# Patient Record
Sex: MALE | Race: BLACK OR AFRICAN AMERICAN | NOT HISPANIC OR LATINO | Employment: PART TIME | URBAN - METROPOLITAN AREA
[De-identification: names, ages, dates, MRNs, and addresses within clinical notes are randomized per-mention and may not be internally consistent; named-entity substitution may affect disease eponyms.]

---
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
## 2023-09-01 NOTE — PATIENT INSTRUCTIONS
Can return to work with glove on the hand.  Be careful to avoid injury on the R hand for the next two weeks.  Return for any changes.   Yes

## 2025-03-24 ENCOUNTER — HOSPITAL ENCOUNTER (EMERGENCY)
Facility: HOSPITAL | Age: 20
Discharge: HOME | End: 2025-03-24
Attending: EMERGENCY MEDICINE

## 2025-03-24 VITALS
TEMPERATURE: 98 F | WEIGHT: 180 LBS | DIASTOLIC BLOOD PRESSURE: 71 MMHG | HEIGHT: 70 IN | HEART RATE: 70 BPM | BODY MASS INDEX: 25.77 KG/M2 | SYSTOLIC BLOOD PRESSURE: 102 MMHG | RESPIRATION RATE: 19 BRPM | OXYGEN SATURATION: 100 %

## 2025-03-24 DIAGNOSIS — H01.001 BLEPHARITIS OF RIGHT UPPER EYELID, UNSPECIFIED TYPE: Primary | ICD-10-CM

## 2025-03-24 PROCEDURE — 99283 EMERGENCY DEPT VISIT LOW MDM: CPT

## 2025-03-24 RX ORDER — ERYTHROMYCIN 5 MG/G
OINTMENT OPHTHALMIC
Qty: 3.5 G | Refills: 0 | Status: SHIPPED | OUTPATIENT
Start: 2025-03-24

## 2025-03-24 NOTE — ED PROVIDER NOTES
Encounter Date: 3/24/2025       History     Chief Complaint   Patient presents with    Eye Problem     C/O R eye swelling since yesterday morning w/ irritation. Denies drainage/vision change     See MDM    The history is provided by the patient. No  was used.     Review of patient's allergies indicates:  No Known Allergies  Past Medical History:   Diagnosis Date    Family history of ulcerative colitis 2/6/2014    Hypertriglyceridemia 8/10/2019     No past surgical history on file.  Family History   Problem Relation Name Age of Onset    Ulcerative colitis Mother Annia      Social History[1]  Review of Systems   Eyes:  Positive for redness.   All other systems reviewed and are negative.      Physical Exam     Initial Vitals [03/24/25 1222]   BP Pulse Resp Temp SpO2   121/81 80 18 97.8 °F (36.6 °C) 99 %      MAP       --         Physical Exam    Nursing note and vitals reviewed.  Constitutional: He appears well-developed and well-nourished.   Eyes: Conjunctivae and EOM are normal. Pupils are equal, round, and reactive to light.   Swelling along lash line with some redness. No obvious stye. No drainage. Conjunctiva normal    Cardiovascular:  Normal rate.           Pulmonary/Chest: No respiratory distress.     Neurological: He is alert and oriented to person, place, and time.   Skin: Skin is warm and dry.         ED Course   Procedures  Labs Reviewed - No data to display       Imaging Results    None          Medications - No data to display  Medical Decision Making  19 y/o male presents with 1 week of right upper eyelid irritation/redness. No visual changes. No eye drainage.     Will treat for blepharitis with warm compresses and eye ointment       Additional MDM:   Differential Diagnosis:   Other: The following diagnoses were also considered and will be evaluated: stye, blepharitis and conjunctivitis.                                   Clinical Impression:  Final diagnoses:  [H01.001] Blepharitis  PT DAILY TREATMENT NOTE     Patient Name: Karine Azul. Date:2021  : 1946  [x]  Patient  Verified  Payor: William Moscoso / Plan: [employer] / Product Type: Managed Care Medicare /    In time: 10:16 AM  Out time: 11:10 AM  Total Treatment Time (min): 54  Visit #: 2 of 12    Medicare/BCBS Only   Total Timed Codes (min):  44 1:1 Treatment Time:  44       Treatment Area: Pain in right shoulder [M25.511]    SUBJECTIVE  Pain Level (0-10 scale): 6  Any medication changes, allergies to medications, adverse drug reactions, diagnosis change, or new procedure performed?: [x] No    [] Yes (see summary sheet for update)  Subjective functional status/changes:   [] No changes reported  \"I've been more sore but I've been doing the exercises\"    OBJECTIVE    Modality rationale: decrease inflammation and decrease pain to improve the patients ability to tolerate activity through remainder of day. Min Type Additional Details   10 [x]  Ice     []  heat  []  Ice massage  []  Laser   []  Anodyne Position:  Location: right shoulder     29 min Therapeutic Exercise:  [x] See flow sheet :   Rationale: increase ROM and increase strength to improve the patients ability to tolerate daily activity. 15 min Manual Therapy:    Left sidelying - right scapular mobilizations  Supine - Grade I-II oscillatory mobilizations to right GHJ posterior/inferior; manual flexion/scaption/ER   The manual therapy interventions were performed at a separate and distinct time from the therapeutic activities interventions. Rationale: decrease pain, increase ROM and increase tissue extensibility to progress protocol for improved ADL performance.              With   [] TE   [] TA   [] neuro   [] other: Patient Education: [x] Review HEP    [] Progressed/Changed HEP based on:   [] positioning   [] body mechanics   [] transfers   [] heat/ice application    [] other:      Other Objective/Functional Measures: Introduced of right upper eyelid, unspecified type (Primary)          ED Disposition Condition    Discharge Stable          ED Prescriptions       Medication Sig Dispense Start Date End Date Auth. Provider    erythromycin (ROMYCIN) ophthalmic ointment Place a 1/2 inch ribbon of ointment into the lower eyelid. 3.5 g 3/24/2025 -- Sadie Hester FNP          Follow-up Information       Follow up With Specialties Details Why Contact Info    Linda Baez MD Pediatrics Call in 1 week As needed [id number] [facility] [id number]  [phone]                 [1]   Social History  Tobacco Use    Smoking status: Never    Smokeless tobacco: Never   Substance Use Topics    Alcohol use: No    Drug use: No        Sadie Hester FNP  03/24/25 3137     pulley exercises     Pain Level (0-10 scale) post treatment: 2    ASSESSMENT/Changes in Function: Flexion only performed with pulleys due to inability to achieve safe scaption postioning, will progress as able. Patient will continue to benefit from skilled PT services to modify and progress therapeutic interventions, address functional mobility deficits, address ROM deficits, address strength deficits, analyze and address soft tissue restrictions, analyze and cue movement patterns and assess and modify postural abnormalities to attain remaining goals. Progress towards goals / Updated goals:  Short Term Goals: To be accomplished in 1 weeks:  1.  Patient will become proficient in their HEP and will be compliant in performing that program.  Evaluation:   Patient given a written/illustrated HEP. Current: 2/26/2021 - MET - patient reports daily compliance as prescribed  2.  Patient will demonstrate PROM right shoulder 0-110 deg; abd 0-90 deg in order to increase functional mobility. Evaluation:  PROM right shoulder flex 0-90; abd 0-50 degrees.     Long Term Goals: To be accomplished in 4 weeks:  1. Patient's pain level will be 0/10-3/10 with activity in order to improve patient's ability to perform normal ADLs. Evaluation:  0/10-10-10  2. Patient will demonstrate AROM right shoulder flex 0-100, scaption 0-85, IR 0-10, ER 0-20 to increase ease of ADLs. Evaluation:  ARoM left shoulder flex 0-135; abd 0-106.  Right NT. 3. Patient will increase FOTO score to  64 to indicate increased functional mobility. Evaluation:  38  4. Patient will be able to reach to the bottom shelf of a kitchen cabinet in order to perform normal ADLs. Evaluation:  Notes, \"I can't do that\".     PLAN  []  Upgrade activities as tolerated     [x]  Continue plan of care  []  Update interventions per flow sheet       []  Discharge due to:_  []  Other:_      Savanna Hobbs, PT 2/26/2021  10:35 AM    Future Appointments   Date Time Provider [place]   3/1/2021  2:00 PM Gleen David, PT [facility] [address]   3/3/2021 11:45 AM Donte Ke, PT [facility] [address]   3/5/2021  1:15 PM Bright So, PTA [facility] [address]   3/8/2021 12:30 PM Gleen Cleveland, PT [facility] [address]   3/10/2021 12:30 PM Bright So, PTA [facility] [address]   3/12/2021  1:15 PM Bright So, PTA [facility] [address]   3/15/2021 12:30 PM Gleen David, PT [facility] [address]   3/17/2021 12:30 PM Bright So, PTA [facility] [address]   3/19/2021  1:15 PM Bright So, PTA [facility] [address]   3/22/2021 12:30 PM Gleen Cleveland, PT [facility] [address]   3/24/2021 12:30 PM Bright So, PTA [facility] [address]   3/26/2021  1:15 PM Vanna Staples [facility] [address]   3/29/2021 12:30 PM Gleen David, PT [facility] [address]   3/31/2021 12:30 PM Bright So, PTA [facility] [address]   4/2/2021  1:15 PM Gleen Cleveland, PT [facility] [address]   4/5/2021 12:30 PM Gleen Cleveland, PT [facility] [address]   4/6/2021  1:10 PM Mohan Cornejo MD [facility]   4/7/2021 12:30 PM Bright So, PTA [facility] [address]   4/9/2021  1:15 PM Gleen David, PT [facility] [address]

## 2025-03-24 NOTE — FIRST PROVIDER EVALUATION
"Medical screening examination initiated.  I have conducted a focused provider triage encounter, findings are as follows:    Brief history of present illness:  c/o R eye swelling that began today. Denies vision changes.     Vitals:    03/24/25 1222   BP: 121/81   Pulse: 80   Resp: 18   Temp: 97.8 °F (36.6 °C)   SpO2: 99%   Weight: 81.6 kg (180 lb)   Height: 5' 10" (1.778 m)       Pertinent physical exam:  awake, alert, has non-labored breathing, ambulatory into triage    Brief workup plan:  provider evaluation    Preliminary workup initiated; this workup will be continued and followed by the physician or advanced practice provider that is assigned to the patient when roomed.  "

## 2025-03-24 NOTE — DISCHARGE INSTRUCTIONS
Warm compresses often. Wash hands. Avoid rubbing. Use erythromycin eye ointment to right upper lash line as directed.